# Patient Record
Sex: MALE | Race: WHITE | URBAN - METROPOLITAN AREA
[De-identification: names, ages, dates, MRNs, and addresses within clinical notes are randomized per-mention and may not be internally consistent; named-entity substitution may affect disease eponyms.]

---

## 2024-07-01 ENCOUNTER — TELEPHONE (OUTPATIENT)
Facility: CLINIC | Age: 24
End: 2024-07-01

## 2024-07-01 NOTE — TELEPHONE ENCOUNTER
7/1/24-Received via fax from [facility] medical records dated 10/2020-6/2023. Placed in folder at .

## 2024-07-24 ENCOUNTER — OFFICE VISIT (OUTPATIENT)
Facility: CLINIC | Age: 24
End: 2024-07-24
Payer: COMMERCIAL

## 2024-07-24 ENCOUNTER — TELEPHONE (OUTPATIENT)
Facility: CLINIC | Age: 24
End: 2024-07-24

## 2024-07-24 VITALS
DIASTOLIC BLOOD PRESSURE: 68 MMHG | HEIGHT: 70.5 IN | WEIGHT: 165 LBS | SYSTOLIC BLOOD PRESSURE: 118 MMHG | HEART RATE: 79 BPM | OXYGEN SATURATION: 96 % | BODY MASS INDEX: 23.36 KG/M2

## 2024-07-24 DIAGNOSIS — Z46.81 INSULIN PUMP TITRATION: ICD-10-CM

## 2024-07-24 DIAGNOSIS — E10.65 TYPE 1 DIABETES MELLITUS WITH HYPERGLYCEMIA (HCC): Primary | ICD-10-CM

## 2024-07-24 DIAGNOSIS — Z96.41 INSULIN PUMP IN PLACE: ICD-10-CM

## 2024-07-24 LAB
CARTRIDGE LOT#: ABNORMAL NUMERIC
HEMOGLOBIN A1C: 8.2 % (ref 4.3–5.6)

## 2024-07-24 PROCEDURE — 99205 OFFICE O/P NEW HI 60 MIN: CPT

## 2024-07-24 PROCEDURE — 3052F HG A1C>EQUAL 8.0%<EQUAL 9.0%: CPT

## 2024-07-24 PROCEDURE — 83036 HEMOGLOBIN GLYCOSYLATED A1C: CPT

## 2024-07-24 PROCEDURE — 3008F BODY MASS INDEX DOCD: CPT

## 2024-07-24 PROCEDURE — 3074F SYST BP LT 130 MM HG: CPT

## 2024-07-24 PROCEDURE — 3078F DIAST BP <80 MM HG: CPT

## 2024-07-24 PROCEDURE — 95251 CONT GLUC MNTR ANALYSIS I&R: CPT

## 2024-07-24 RX ORDER — INSULIN LISPRO 100 [IU]/ML
INJECTION, SOLUTION INTRAVENOUS; SUBCUTANEOUS
COMMUNITY
Start: 2024-05-20 | End: 2024-07-24

## 2024-07-24 RX ORDER — GLUCAGON 3 MG/1
3 POWDER NASAL ONCE AS NEEDED
Qty: 1 EACH | Refills: 0 | Status: SHIPPED | OUTPATIENT
Start: 2024-07-24 | End: 2024-07-24

## 2024-07-24 RX ORDER — INSULIN LISPRO 100 [IU]/ML
INJECTION, SOLUTION INTRAVENOUS; SUBCUTANEOUS
Qty: 130 ML | Refills: 2 | Status: SHIPPED | OUTPATIENT
Start: 2024-07-24

## 2024-07-24 RX ORDER — INSULIN DEGLUDEC 100 U/ML
INJECTION, SOLUTION SUBCUTANEOUS
Qty: 15 ML | Refills: 1 | Status: SHIPPED | OUTPATIENT
Start: 2024-07-24

## 2024-07-24 RX ORDER — ACYCLOVIR 400 MG/1
1 TABLET ORAL
Qty: 9 EACH | Refills: 3 | Status: SHIPPED | OUTPATIENT
Start: 2024-07-24

## 2024-07-24 RX ORDER — BLOOD-GLUCOSE SENSOR
EACH MISCELLANEOUS
COMMUNITY
Start: 2024-05-20

## 2024-07-24 RX ORDER — PEN NEEDLE, DIABETIC 32GX 5/32"
NEEDLE, DISPOSABLE MISCELLANEOUS
Qty: 100 EACH | Refills: 1 | Status: SHIPPED | OUTPATIENT
Start: 2024-07-24

## 2024-07-24 RX ORDER — SUBCUTANEOUS INSULIN PUMP
EACH MISCELLANEOUS
COMMUNITY
Start: 2024-07-24

## 2024-07-24 NOTE — TELEPHONE ENCOUNTER
Ordered Dexcom G7 to pharamcy  Started insurance inquiry with tandem for T-slim pump via parachute

## 2024-07-24 NOTE — PATIENT INSTRUCTIONS
Return in about 2 months (around 9/24/2024).    Office phone number: [phone]    - complete fasting labs    Medtronic pump + Ayush 3 CGM   Time Basal ICR ISF   MN 1.40 u/hr --> 1.50 u/hr 8  40   3A 1.40 u/hr--> 1.50 u/hr     7A  8 40   9A 1.50 u/hr        40   2P 1.50 u/hr  35      35   630P  8    10P 1.60 u/hr

## 2024-07-24 NOTE — PROGRESS NOTES
[facility]    Name: Reuben Thompson    Date: 7/24/2024    Reuben Thompson is a 23 year old male who presents for evaluation of T1DM management.     Chief complaint: New Patient (NP here to establish care for DM 1, per pt no current concerns or sx.////)       Subjective:   Initial HPI consult in July 2024  Here to establish care for uncontrolled type 1 diabetes.  Is transferring care from [facility], in [place].  Previously followed with Dr. Kailey Olivas.  Reviewed chart notes has faxed over.  M hx:  -Diagnosed with diabetes at age 18 months.   - Started insulin pump age 7  - Did have 1 severe hypoglycemia episode as a child  - Denies history of DKA, was hospitalized at a young age for high BG   - History of celiac disease diagnosed at a young age-- he eats gluten now and hasn't had a problem  - Family history- none known    - Notes the hardest part for him is to remember to enter [employer]  - No known fam hx of autoimmunity   -Does NOT have any associated DM complications, including: Macrovascular complications (CAD/CVA/PAD), neuropathy, retinopathy, nephropathy, HTN, HLD, stroke, gastroparesis. No hx of pancreatitis.   -Lifestyle: lives stepmom and dad, brother and sister. Works as a  for [employer]-- day shift. He enjoys cars. Also drives motorcycles. He does consume alcohol on the weekends. He states he 'eats like he doesn't have diabetes'. Now that he's moved home with stepmom and dad there is more food and financial stability      DM meds at first office visit: Medtronic (from 2010-2012ish) + Ayush 3 CGM   Time Basal ICR ISF Active Insulin Time Target   MN 1.40 u/hr 8 40 2h    3A 1.40 u/hr         7A  8 40     9A 1.50 u/hr            40     2P 1.50 u/hr  35          35     630P  8      10P 1.60 u/hr           Est basal dose 35.30 u/day  7/19 86u/day  7/21- 79u/day  7/22 73u/day  7/23 103u/day    Continuous Glucose Monitoring Interpretation  Reuben Thompson has undergone  continuous glucose monitoring with the Freestyle Ayush 3 CGM with phone (connected to clinic profile).  The blood glucose tracings were evaluated for two weeks prior to office visit. Blood glucose tracings demonstrated areas of hyperglycemia overnight, also post prandial excursions . There were several areas of hypoglycemia notedduring the weeks of evaluation.        Previously trialed/failed DM meds: novolog- prefers humalog    History/Other:    Allergies, PMH, SocHx and FHx reviewed and updated as appropriate in Epic on    Continuous Glucose Sensor (FREESTYLE AYUSH 3 SENSOR) Does not apply Misc USE 1 EVERY 2 WEEKS      HUMALOG 100 UNIT/ML Injection Solution Use to fill insulin pump with up to 130u/day 130 mL 2    Urine Glucose-Ketones Test In Vitro Strip Use as needed, with ANY episode of VOMITING, during illness, and/or if 2+ BG >250 despite usual correction. 100 strip 0    glucagon (BAQSIMI TWO PACK) 3 MG/DOSE Nasal nasal powder 3 mg by Nasal route once as needed for Low blood glucose. 1 each 0    Insulin Pen Needle (BD PEN NEEDLE OVIDIO U/F) 32G X 4 MM Does not apply Misc Use in the event of pump failure to inject lantus daily. 100 each 1    insulin degludec (TRESIBA FLEXTOUCH) 100 UNIT/ML Subcutaneous Solution Pen-injector Use in the event of pump failure. Inject 30u daily. 15 mL 1    Insulin Syringe-Needle U-100 31G X 15/64\" 0.5 ML Does not apply Misc 1 Syringe 3 (three) times daily before meals as needed. Use in the event of pump failure to inject humalog from vials 100 each 0    Insulin Infusion Pump (T:SLIM X2 CONTROL-IQ PUMP) Does not apply Device Ordered through tandem 7/24/2024       No Known Allergies  Current Outpatient Medications   Medication Sig Dispense Refill    Continuous Glucose Sensor (FREESTYLE AYUSH 3 SENSOR) Does not apply Misc USE 1 EVERY 2 WEEKS      HUMALOG 100 UNIT/ML Injection Solution Use to fill insulin pump with up to 130u/day 130 mL 2    Urine Glucose-Ketones Test In Vitro Strip Use  as needed, with ANY episode of VOMITING, during illness, and/or if 2+ BG >250 despite usual correction. 100 strip 0    glucagon (BAQSIMI TWO PACK) 3 MG/DOSE Nasal nasal powder 3 mg by Nasal route once as needed for Low blood glucose. 1 each 0    Insulin Pen Needle (BD PEN NEEDLE OVIDIO U/F) 32G X 4 MM Does not apply Misc Use in the event of pump failure to inject lantus daily. 100 each 1    insulin degludec (TRESIBA FLEXTOUCH) 100 UNIT/ML Subcutaneous Solution Pen-injector Use in the event of pump failure. Inject 30u daily. 15 mL 1    Insulin Syringe-Needle U-100 31G X 15/64\" 0.5 ML Does not apply Misc 1 Syringe 3 (three) times daily before meals as needed. Use in the event of pump failure to inject humalog from vials 100 each 0    Insulin Infusion Pump (T:SLIM X2 CONTROL-IQ PUMP) Does not apply Device Ordered through tandem 7/24/2024      Continuous Glucose Sensor (DEXCOM G7 SENSOR) Does not apply Misc 1 each Every 10 days. Use as directed every 10 days 9 each 3     Past Medical History:    Type 1 diabetes (HCC)     History reviewed. No pertinent family history.  Social history: Reviewed.      ROS/Exam    REVIEW OF SYSTEMS: Ten point review of systems has been performed and is otherwise negative and/or non-contributory, except as described above.     VITALS  Vitals:    07/24/24 1410   BP: 118/68   BP Location: Left arm   Patient Position: Sitting   Cuff Size: adult   Pulse: 79   SpO2: 96%   Weight: 165 lb (74.8 kg)   Height: 5' 10.5\" (1.791 m)       Wt Readings from Last 6 Encounters:   07/24/24 165 lb (74.8 kg)       PHYSICAL EXAM  CONSTITUTIONAL:  awake, alert, cooperative, no apparent distress, and appears stated age   PSYCH: normal affect  LUNGS: breathing comfortably  CARDIOVASCULAR:  regular rate     Labs/Imaging: Pertinent imaging reviewed.    Overall glucose control:  Lab Results   Component Value Date    A1C 8.2 (A) 07/24/2024       Supplementary Documentation:   -Surveillance for Diabetes Complications &  Risks  Foot exam/neuropathy: No data recorded  Retinopathy screening: No data recordedNo data recorded    Assessment & Plan:     ICD-10-CM    1. Type 1 diabetes mellitus with hyperglycemia (HCC)  E10.65 CMP Now     Lipids Now     Hemoglobin A1C Now     Microalb/Creat Ratio, Random Urine Now     TSH and Free T4 [E]     Continuous Glucose Sensor (FREESTYLE JOSIE 3 SENSOR) Does not apply Misc     HUMALOG 100 UNIT/ML Injection Solution     Urine Glucose-Ketones Test In Vitro Strip     glucagon (BAQSIMI TWO PACK) 3 MG/DOSE Nasal nasal powder     GLUC MNTR CONT REC FROM NTRSTL TISS FLU PHYS I&R     Insulin Pen Needle (BD PEN NEEDLE OVIDIO U/F) 32G X 4 MM Does not apply Misc     insulin degludec (TRESIBA FLEXTOUCH) 100 UNIT/ML Subcutaneous Solution Pen-injector     Insulin Syringe-Needle U-100 31G X 15/64\" 0.5 ML Does not apply Misc     Insulin Infusion Pump (T:SLIM X2 CONTROL-IQ PUMP) Does not apply Device     HEMOGLOBIN A1C      2. Insulin pump in place  Z96.41       3. Insulin pump titration  Z46.81           Reuben Thompson is a pleasant 23 year old male here for evaluation of:    #Diabetes- PMHx of Type 1 diabetes mellitus diagnosed in 2001. Switched to insulin pump in 2009 (age 7). Historically uncontrolled DM. Improving (states today is his best A1C in a long time). Has more stability in current living situation.  Overnight hyperglycemia-- will incr basals (using TDD 80-100u/day, likely under basalized). Will make more setting adjustments once we switch to closed loop pump. He is interested in starting Tandem insulin pump.  Will start order + transition to dexcom G7-- pending insurance. We reviewed pump back up plan, ketone testing and glucagon use briefly today.     Last A1c value was 8.2% done 7/24/2024. Goal <7%. Importance of better glucose control in preventing onset/progression of end-organ damage discussed, as well as goals of therapy and clinical significance of A1C.     - med changes:  - Medtronic pump +  Ayush 3 CGM   Time Basal ICR ISF   MN 1.40 u/hr --> 1.60 u/hr 8  40   3A 1.40 u/hr--> 1.60 u/hr     7A  8- 40   9A 1.50 u/hr        40   2P 1.50 u/hr  35      35   630P  8    10P 1.60 u/hr       - start Dexcom G7 CGM with phone (connected to clinic profile)  - patient is on insulin, and has suboptimal glycemic control including wide glycemic swings, thus would benefit from CGM  - pump backup plan: tresiba 30u daily, humalog ICR 1:8, ISF 1:40>140    -See above header \"Supplementary Documentation\" for surveillance for diabetes complications & risks    #Nephropathy screening/CKD:   No results found for: \"EGFRCR\", \"MICROALBCREA\"    -Due for repeat, ordered today    #Blood pressure control: SBP is not to goal <130    -Due for repeat, ordered today  BP Readings from Last 1 Encounters:   07/24/24 118/68   BP Meds:  none    #Hyperlipidemia/Lipids: LDL is not to goal No results found for: \"LDL\", \"TRIG\"  Cholesterol Meds:  none   -Due for repeat, ordered today      Return in about 2 months (around 9/24/2024).    A total of 60 minutes was spent today on obtaining history, reviewing pertinent labs, evaluating patient, providing multiple treatment options, reinforcing diet/exercise and compliance, and completing documentation.       7/24/2024  MITALI Adam    Note to patient: The 21 Century Cures Act makes medical notes like these available to patients in the interest of transparency. However, be advised this is a medical document. It is intended as peer to peer communication. It is written in medical language and may contain abbreviations or verbiage that are unfamiliar. It may appear blunt or direct. Medical documents are intended to carry relevant information, facts as evident, and the clinical opinion of the practitioner.

## 2024-09-26 ENCOUNTER — OFFICE VISIT (OUTPATIENT)
Facility: CLINIC | Age: 24
End: 2024-09-26
Payer: COMMERCIAL

## 2024-09-26 VITALS
OXYGEN SATURATION: 96 % | SYSTOLIC BLOOD PRESSURE: 102 MMHG | WEIGHT: 170.63 LBS | DIASTOLIC BLOOD PRESSURE: 62 MMHG | BODY MASS INDEX: 24 KG/M2 | HEART RATE: 71 BPM

## 2024-09-26 DIAGNOSIS — Z46.81 INSULIN PUMP TITRATION: ICD-10-CM

## 2024-09-26 DIAGNOSIS — E10.65 TYPE 1 DIABETES MELLITUS WITH HYPERGLYCEMIA (HCC): Primary | ICD-10-CM

## 2024-09-26 PROCEDURE — 3078F DIAST BP <80 MM HG: CPT

## 2024-09-26 PROCEDURE — 3074F SYST BP LT 130 MM HG: CPT

## 2024-09-26 PROCEDURE — 99215 OFFICE O/P EST HI 40 MIN: CPT

## 2024-09-26 NOTE — PROGRESS NOTES
[facility]    Name: Reuben Thompson    Date: 9/26/2024    Reuben Thompson is a 23 year old male who presents for evaluation of T1DM management.     Chief complaint: Follow - Up (Patient states has been having highs. )     Subjective:   Initial HPI consult in July 2024  Here to establish care for uncontrolled type 1 diabetes.  Is transferring care from [facility], in [place].  Previously followed with Dr. Kailey Olivas.  Reviewed chart notes has faxed over.  M hx:  -Diagnosed with diabetes at age 18 months.   - Started insulin pump age 7  - Did have 1 severe hypoglycemia episode as a child  - Denies history of DKA, was hospitalized at a young age for high BG   - History of celiac disease diagnosed at a young age-- he eats gluten now and hasn't had a problem  - Family history- none known    - Notes the hardest part for him is to remember to enter [employer]  - No known fam hx of autoimmunity   -Does NOT have any associated DM complications, including: Macrovascular complications (CAD/CVA/PAD), neuropathy, retinopathy, nephropathy, HTN, HLD, stroke, gastroparesis. No hx of pancreatitis.   -Lifestyle: lives stepmom and dad, brother and sister. Works as a  for [employer]-- day shift. He enjoys cars. Also drives motorcycles. He does consume alcohol on the weekends. He states he 'eats like he doesn't have diabetes'. Now that he's moved home with stepmom and dad there is more food and financial stability    Interim hx:  9/26/2024-libertad/ Caitlyn JASMINE.Last A1c value was 8.2% done 7/24/2024.  Upgraded to Dexcom G7 and inquried re: tandem pricing. He might move forward with a payment plan for tandem pump, but is going to wait until he uses up some of his medtronic supplies. Notes higher BG overnight while sleeping and sometimes during the day, forgets to take a meal dose. Unable to review dexcom- not yet connected to clinic and there's a problem with his clarity unruly today.    -300  If forgets to  dose at work 200-300    DM meds at visit: Medtronic (from 2010-2012ish) + Ayush 3 CGM   Time Basal ICR ISF   MN 1.50 u/hr 8  40   3A 1.50 u/hr     7A  8 40   9A 1.50 u/hr     11A   40   2P 1.50 u/hr  35   5P   35   630P  8    10P 1.60 u/hr     History from pump re: doses: 80-90u/day      Previously trialed/failed DM meds: novolog- prefers humalog    History/Other:    Allergies, PMH, SocHx and FHx reviewed and updated as appropriate in Epic on   No outpatient medications have been marked as taking for the 9/26/24 encounter (Office Visit) with Holly Das APN.     No Known Allergies  Current Outpatient Medications   Medication Sig Dispense Refill    Continuous Glucose Sensor (FREESTYLE AYUSH 3 SENSOR) Does not apply Misc USE 1 EVERY 2 WEEKS      HUMALOG 100 UNIT/ML Injection Solution Use to fill insulin pump with up to 130u/day 130 mL 2    Continuous Glucose Sensor (DEXCOM G7 SENSOR) Does not apply Misc 1 each Every 10 days. Use as directed every 10 days 9 each 3    Urine Glucose-Ketones Test In Vitro Strip Use as needed, with ANY episode of VOMITING, during illness, and/or if 2+ BG >250 despite usual correction. 100 strip 0    Insulin Pen Needle (BD PEN NEEDLE OVIDIO U/F) 32G X 4 MM Does not apply Misc Use in the event of pump failure to inject lantus daily. 100 each 1    insulin degludec (TRESIBA FLEXTOUCH) 100 UNIT/ML Subcutaneous Solution Pen-injector Use in the event of pump failure. Inject 30u daily. 15 mL 1    Insulin Syringe-Needle U-100 31G X 15/64\" 0.5 ML Does not apply Misc 1 Syringe 3 (three) times daily before meals as needed. Use in the event of pump failure to inject humalog from vials 100 each 0    Insulin Infusion Pump (T:SLIM X2 CONTROL-IQ PUMP) Does not apply Device Ordered through tandem 7/24/2024       Past Medical History:    Type 1 diabetes (HCC)     History reviewed. No pertinent family history.  Social history: Reviewed.      ROS/Exam    REVIEW OF SYSTEMS: Ten point review of systems has  been performed and is otherwise negative and/or non-contributory, except as described above.     VITALS  Vitals:    09/26/24 1047   BP: 102/62   BP Location: Left arm   Patient Position: Sitting   Cuff Size: adult   Pulse: 71   SpO2: 96%   Weight: 170 lb 9.6 oz (77.4 kg)       Wt Readings from Last 6 Encounters:   09/26/24 170 lb 9.6 oz (77.4 kg)   07/24/24 165 lb (74.8 kg)       PHYSICAL EXAM  CONSTITUTIONAL:  awake, alert, cooperative, no apparent distress, and appears stated age   PSYCH: normal affect  LUNGS: breathing comfortably  CARDIOVASCULAR:  regular rate     Labs/Imaging: Pertinent imaging reviewed.    Overall glucose control:  Lab Results   Component Value Date    A1C 8.2 (A) 07/24/2024       Supplementary Documentation:   -Surveillance for Diabetes Complications & Risks  Foot exam/neuropathy: No data recorded  Retinopathy screening: No data recordedNo data recorded    Assessment & Plan:     ICD-10-CM    1. Type 1 diabetes mellitus with hyperglycemia (HCC)  E10.65       2. Insulin pump titration  Z46.81           Reuben Thompson is a pleasant 23 year old male here for evaluation of:    #Type 1 Diabetes- PMHx of Type 1 diabetes mellitus diagnosed in 2001. Switched to insulin pump in 2009 (age 7). Historically uncontrolled DM. Improving (states today is his best A1C in a long time). Has more stability in current living situation.  Overnight hyperglycemia-- will incr basals (using TDD 80-100u/day, likely under basalized). Will make more setting adjustments once we switch to closed loop pump. He is interested in starting Tandem insulin pump- if he pursues payment plan, to let our office know and we can coordinate training. Limited in adjustments today without much data.    Last A1c value was 8.2% done 7/24/2024. Goal <7%. Importance of better glucose control in preventing onset/progression of end-organ damage discussed, as well as goals of therapy and clinical significance of A1C.     - med changes:  -  Medtronic pump (from 2011/2012) + Dexcom G7  Pump settings adjusted (created same profile throughout the day-- once I have more data can adjust)   Time Basal ICR ISF   MN 1.50 u/hr --> 1.70 u/hr 8  40 --> 30   3A 1.50 u/hr --> 1.70 u/hr     7A  8 40-> 30   9A 1.50 u/hr --> 1.70 u/hr     11A   40-> 30   2P 1.50 u/hr--> 1.70 u/hr  35-> 30   5P   35-> 30   630P  8    10P 1.60 u/hr --> 1.70u/hr         - continue Dexcom G7 CGM with phone (connected to clinic profile)  - patient is on insulin, and has suboptimal glycemic control including wide glycemic swings, thus would benefit from CGM  - pump backup plan: tresiba 30u daily, humalog ICR 1:8, ISF 1:40>140   -See above header \"Supplementary Documentation\" for surveillance for diabetes complications & risks    Nephropathy screening  No results found for: \"EGFRCR\", \"MICROALBCREA\"    -Due for repeat, reminded    Blood pressure control: SBP is not to goal <130   - stable  BP Readings from Last 1 Encounters:   09/26/24 102/62   BP Meds:  none    Lipids: LDL is not to goal No results found for: \"LDL\", \"TRIG\"  Cholesterol Meds:  none   -Due for repeat, reminded      Return in about 2 months (around 11/26/2024) for DM follow up.    A total of 40 minutes was spent today on obtaining history, reviewing pertinent labs, evaluating patient, providing multiple treatment options, reinforcing diet/exercise and compliance, and completing documentation.         9/26/2024  MITALI Adam    Note to patient: The 21 Century Cures Act makes medical notes like these available to patients in the interest of transparency. However, be advised this is a medical document. It is intended as peer to peer communication. It is written in medical language and may contain abbreviations or verbiage that are unfamiliar. It may appear blunt or direct. Medical documents are intended to carry relevant information, facts as evident, and the clinical opinion of the practitioner.

## 2024-09-26 NOTE — PATIENT INSTRUCTIONS
Follow up plan:  With MITALI Adam: Return in about 2 months (around 11/26/2024) for DM follow up.    -complete fasting labs at any [facility] lab location     How to connect to the clinic via [employer]  If you do not already have the Clarity unruly, please download the unruly from the unruly store   Use the same login you have for your Dexcom unruly to login to clarity  Once you are logged in, select the \"profile\" button on the lower right corner of your screen.  Then select \"Manage Data Sharing\" and select the blue continue button  Please enter the following clinic code and then select \"continue\"        THE CLINIC CODE IS: [id number]  6. Once you hit continue you should see our clinic name pop up on the screen     Pump settings adjusted:   Time Basal ICR ISF   MN 1.50 u/hr --> 1.70 u/hr 8  40 --> 30   3A 1.50 u/hr --> 1.70 u/hr     7A  8 40-> 30   9A 1.50 u/hr --> 1.70 u/hr     11A   40-> 30   2P 1.50 u/hr--> 1.70 u/hr  35-> 30   5P   35-> 30   630P  8    10P 1.60 u/hr --> 1.70u/hr         Updated/current diabetes medication instructions:  Diabetic Medications               HUMALOG 100 UNIT/ML Injection Solution Use to fill insulin pump with up to 130u/day    insulin degludec (TRESIBA FLEXTOUCH) 100 UNIT/ML Subcutaneous Solution Pen-injector Use in the event of pump failure. Inject 30u daily.            Office phone number: [phone]; phones are open Monday-Friday 8:30-4:30.   Thank you for visiting our office. We look forward to working with you to reach your health goals. As a reminder, if you need refills, please request early so there is enough time to process the request. We ask that you provide at least 5 days' notice before a refill is due, so there is time to send a request to pharmacy, process the refill, and ensure there are no other problems with obtaining the medication (backorders, prior authorization paperwork, etc). Routine refills will not be addressed on weekends, so please submit these requests  during the week.    Blood sugar targets:  Before breakfast: , 2 hours after meals: <180 (preferably <150), A1C goal: <7.0%  Time in Range goal is higher than 80% if using a continuous glucose monitor (Dexcom or Ayush).  Time in Range can be found within the Dexcom G7 unruly, on Clarity if using Dexcom G6, or on LibreView if using Ayush.    HOW TO TREAT LOW BLOOD SUGAR (Hypoglycemia)  Low blood sugar= Less than 70, or if you start to have symptoms (below)  Symptoms: Shaking or trembling, fast heart rate, extreme hunger, sweating, confusion/difficulty concentrating, dizziness.    How to treat a low blood sugar if you are able to eat/drink: The Rule of 15  If you are using continuous glucose monitor that says you are low, but you do not have any symptoms, verify on fingerstick that your blood sugar is actually low before treating.   Eat 15 grams of carbs (see examples below)  Check your blood sugar after 15 minutes. If it’s still below your target range, have another serving.   Repeat these steps until it’s in your target range. Once it’s in range, if you're nervous about your sugar going low again, have a protein source (ie, a spoonful of peanut butter).   If you have a CGM you want to look for how your arrow has changed. If you arrow is pointed up or sideways after 15 min, give your CGM more time OR check with a finger stick. Try not to eat more food until at least 15 min after the first BG check - otherwise you risk having a rebound high.  If you are experiencing symptoms and you are unable to check your blood glucose for any reason, treat the hypoglycemia.  If someone has a low blood sugar and is unconscious: Don’t hesitate to call 911. If someone is unconscious and glucagon is not available or someone does not know how to use it, call 911 immediately.    To treat a low, I recommend you carry with you easy, pre-portioned treatment for low blood sugars that are 15G of carbs:   - Children sized squeeze pouch  applesauce (high fiber + carbs help prevent too high of a spike)  - Small children's sized juicebox- 15g carb --> 4oz juice box  - Glucose tablets from [employer], you can find them near diabetes supplies --> Note, you will need to eat 3-4 tablets to get to 15g of carbs  - Children sized fruit snack pack- look for one with 15 grams of total carbohydrate  - Choice of how to treat your low is important. Complex carbs, or foods that contain fats along with carbs (like chocolate) can slow the absorption of glucose and should NOT be used to treat an emergency low

## 2024-12-03 ENCOUNTER — TELEPHONE (OUTPATIENT)
Facility: CLINIC | Age: 24
End: 2024-12-03

## 2024-12-03 NOTE — TELEPHONE ENCOUNTER
Received letter from [facility]. States \"claims as of 10/31/24 suggest this patient with diabetes may not have had a urine protein test in the last year.\"    Placed in provider in basket for review.

## 2024-12-05 ENCOUNTER — LAB ENCOUNTER (OUTPATIENT)
Dept: LAB | Age: 24
End: 2024-12-05
Payer: COMMERCIAL

## 2024-12-05 ENCOUNTER — OFFICE VISIT (OUTPATIENT)
Facility: CLINIC | Age: 24
End: 2024-12-05
Payer: COMMERCIAL

## 2024-12-05 VITALS
SYSTOLIC BLOOD PRESSURE: 118 MMHG | HEIGHT: 70 IN | WEIGHT: 179.19 LBS | BODY MASS INDEX: 25.65 KG/M2 | OXYGEN SATURATION: 93 % | HEART RATE: 82 BPM | DIASTOLIC BLOOD PRESSURE: 62 MMHG

## 2024-12-05 DIAGNOSIS — E10.65 TYPE 1 DIABETES MELLITUS WITH HYPERGLYCEMIA (HCC): ICD-10-CM

## 2024-12-05 DIAGNOSIS — Z46.81 INSULIN PUMP TITRATION: ICD-10-CM

## 2024-12-05 DIAGNOSIS — E10.65 TYPE 1 DIABETES MELLITUS WITH HYPERGLYCEMIA (HCC): Primary | ICD-10-CM

## 2024-12-05 LAB
ALBUMIN SERPL-MCNC: 4.4 G/DL (ref 3.2–4.8)
ALBUMIN/GLOB SERPL: 1.6 {RATIO} (ref 1–2)
ALP LIVER SERPL-CCNC: 108 U/L
ALT SERPL-CCNC: 23 U/L
ANION GAP SERPL CALC-SCNC: 5 MMOL/L (ref 0–18)
AST SERPL-CCNC: 18 U/L (ref ?–34)
BILIRUB SERPL-MCNC: 0.7 MG/DL (ref 0.3–1.2)
BUN BLD-MCNC: 8 MG/DL (ref 9–23)
CALCIUM BLD-MCNC: 9.6 MG/DL (ref 8.7–10.4)
CHLORIDE SERPL-SCNC: 104 MMOL/L (ref 98–112)
CHOLEST SERPL-MCNC: 142 MG/DL (ref ?–200)
CO2 SERPL-SCNC: 32 MMOL/L (ref 21–32)
CREAT BLD-MCNC: 0.94 MG/DL
EGFRCR SERPLBLD CKD-EPI 2021: 115 ML/MIN/1.73M2 (ref 60–?)
EST. AVERAGE GLUCOSE BLD GHB EST-MCNC: 194 MG/DL (ref 68–126)
FASTING PATIENT LIPID ANSWER: YES
FASTING STATUS PATIENT QL REPORTED: YES
GLOBULIN PLAS-MCNC: 2.7 G/DL (ref 2–3.5)
GLUCOSE BLD-MCNC: 140 MG/DL (ref 70–99)
HBA1C MFR BLD: 8.4 % (ref ?–5.7)
HDLC SERPL-MCNC: 39 MG/DL (ref 40–59)
HEMOGLOBIN A1C: 8.2 % (ref 4.3–5.6)
LDLC SERPL CALC-MCNC: 90 MG/DL (ref ?–100)
NONHDLC SERPL-MCNC: 103 MG/DL (ref ?–130)
OSMOLALITY SERPL CALC.SUM OF ELEC: 293 MOSM/KG (ref 275–295)
POTASSIUM SERPL-SCNC: 4 MMOL/L (ref 3.5–5.1)
PROT SERPL-MCNC: 7.1 G/DL (ref 5.7–8.2)
SODIUM SERPL-SCNC: 141 MMOL/L (ref 136–145)
T4 FREE SERPL-MCNC: 1.2 NG/DL (ref 0.8–1.7)
TRIGL SERPL-MCNC: 65 MG/DL (ref 30–149)
TSI SER-ACNC: 1.1 UIU/ML (ref 0.55–4.78)
VLDLC SERPL CALC-MCNC: 10 MG/DL (ref 0–30)

## 2024-12-05 PROCEDURE — 3052F HG A1C>EQUAL 8.0%<EQUAL 9.0%: CPT

## 2024-12-05 PROCEDURE — 80061 LIPID PANEL: CPT

## 2024-12-05 PROCEDURE — 84439 ASSAY OF FREE THYROXINE: CPT

## 2024-12-05 PROCEDURE — 99214 OFFICE O/P EST MOD 30 MIN: CPT

## 2024-12-05 PROCEDURE — 3078F DIAST BP <80 MM HG: CPT

## 2024-12-05 PROCEDURE — 84443 ASSAY THYROID STIM HORMONE: CPT

## 2024-12-05 PROCEDURE — 3074F SYST BP LT 130 MM HG: CPT

## 2024-12-05 PROCEDURE — 3008F BODY MASS INDEX DOCD: CPT

## 2024-12-05 PROCEDURE — 95251 CONT GLUC MNTR ANALYSIS I&R: CPT

## 2024-12-05 PROCEDURE — 83036 HEMOGLOBIN GLYCOSYLATED A1C: CPT

## 2024-12-05 PROCEDURE — 80053 COMPREHEN METABOLIC PANEL: CPT

## 2024-12-05 NOTE — PATIENT INSTRUCTIONS
Follow up plan:  With MITALI Adam: Return in about 2 months (around 2/5/2025) for diabetes follow up.   - Schedule Tandem pump training with Elsa next Thursday at 10:15   - get labs done downstairs     - complete your diabetes eye exam. This exam is critical to preventing and treating eye conditions caused by diabetes that could potentially lead to vision loss. Once complete, please call your eye care provider and ask them to fax your latest report to our office. This will help us update our records. Our fax number is: [phone]     Eye doctor list  [place] area:  -Dr. Fitzgerald with [facility]  -[facility]: Dr Stephanie Godfrey-[phone]  -[facility]: [phone]  [facility]: Phone: [phone], Address: [address] [place] [address]  [place] ophthalmologist: Dr. Connors [phone] locations in [place], [place], [facility]:  Dr. Oppenheim in [place]- [phone]  [facility]- Dr. Wilkerson- [phone]  [facility]:  [phone]  [place]:   [facility] [address] [place] [address]- [phone]  [facility] - Dr. Bill [phone]    Medtronic pump (from 2011/2012, unable to download) + Dexcom G7   Time Basal ICR ISF   MN  1.70 u/hr --> 1.50 u/hr 8  30   3A 1.70 u/hr     7A  8 30   9A 1.70 u/hr     11A   30   2P  1.70 u/hr  30   5P   30   630P --> 4pm  8-->7    10P 1.70u/hr       - pump backup plan: tresiba 30u daily, humalog ICR 1:8, ISF 1:40>140       Lab Results   Component Value Date    A1C 8.2 (A) 12/05/2024    A1C 8.2 (A) 07/24/2024        Updated/current diabetes medication instructions:  Diabetic Medications               HUMALOG 100 UNIT/ML Injection Solution (Taking) Use to fill insulin pump with up to 130u/day    insulin degludec (TRESIBA FLEXTOUCH) 100 UNIT/ML Subcutaneous Solution Pen-injector (Taking) Use in the  event of pump failure. Inject 30u daily.            Office phone number: [phone]; phones are open Monday-Friday 8:30-4:30.   Thank you for visiting our office. We look forward to working with you to reach your health goals. As a reminder, if you need refills, please request early so there is enough time to process the request. We ask that you provide at least 5 days' notice before a refill is due, so there is time to send a request to pharmacy, process the refill, and ensure there are no other problems with obtaining the medication (backorders, prior authorization paperwork, etc). Routine refills will not be addressed on weekends, so please submit these requests during the week.    Blood sugar targets:  Before breakfast: , 2 hours after meals: <180 (preferably <150), A1C goal: <7.0%  Time in Range goal is higher than 80% if using a continuous glucose monitor (Dexcom or Ayush).  Time in Range can be found within the Dexcom G7 unruly, on Clarity if using Dexcom G6, or on LibreView if using Ayush.    HOW TO TREAT LOW BLOOD SUGAR (Hypoglycemia)  Low blood sugar= Less than 70, or if you start to have symptoms (below)  Symptoms: Shaking or trembling, fast heart rate, extreme hunger, sweating, confusion/difficulty concentrating, dizziness.    How to treat a low blood sugar if you are able to eat/drink: The Rule of 15  If you are using continuous glucose monitor that says you are low, but you do not have any symptoms, verify on fingerstick that your blood sugar is actually low before treating.   Eat 15 grams of carbs (see examples below)  Check your blood sugar after 15 minutes. If it’s still below your target range, have another serving.   Repeat these steps until it’s in your target range. Once it’s in range, if you're nervous about your sugar going low again, have a protein source (ie, a spoonful of peanut butter).   If you have a CGM you want to look for how your arrow has changed. If you arrow is pointed up or  sideways after 15 min, give your CGM more time OR check with a finger stick. Try not to eat more food until at least 15 min after the first BG check - otherwise you risk having a rebound high.  If you are experiencing symptoms and you are unable to check your blood glucose for any reason, treat the hypoglycemia.  If someone has a low blood sugar and is unconscious: Don’t hesitate to call 911. If someone is unconscious and glucagon is not available or someone does not know how to use it, call 911 immediately.    To treat a low, I recommend you carry with you easy, pre-portioned treatment for low blood sugars that are 15G of carbs:   - Children sized squeeze pouch applesauce (high fiber + carbs help prevent too high of a spike)  - Small children's sized juicebox- 15g carb --> 4oz juice box  - Glucose tablets from [employer], you can find them near diabetes supplies --> Note, you will need to eat 3-4 tablets to get to 15g of carbs  - Children sized fruit snack pack- look for one with 15 grams of total carbohydrate  - Choice of how to treat your low is important. Complex carbs, or foods that contain fats along with carbs (like chocolate) can slow the absorption of glucose and should NOT be used to treat an emergency low

## 2024-12-05 NOTE — PROGRESS NOTES
[facility] Note    Name: Reuben Thompson    Date: 12/5/2024    Reuben Thompson is a 24 year old male who presents for evaluation of T1DM management.     Chief complaint: Follow - Up (Pt presents for DM follow up. Repeat A1C completed in clinic.  States has been improved since adjustments to insulin but still forgets to enter adjustments prior to eating. Unable to download Medtronic pump d/t being too old. )     Subjective:   Initial HPI consult in July 2024  Here to establish care for uncontrolled type 1 diabetes.  Is transferring care from [facility], in [place].  Previously followed with Dr. Kailey Olivas.  Reviewed chart notes has faxed over.  M hx:  -Diagnosed with diabetes at age 18 months.   - Started insulin pump age 7  - Did have 1 severe hypoglycemia episode as a child  - Denies history of DKA, was hospitalized at a young age for high BG   - History of celiac disease diagnosed at a young age-- he eats gluten now and hasn't had a problem  - Family history- none known    - Notes the hardest part for him is to remember to enter carbs  - No known fam hx of autoimmunity   -Does NOT have any associated DM complications, including: Macrovascular complications (CAD/CVA/PAD), neuropathy, retinopathy, nephropathy, HTN, HLD, stroke, gastroparesis. No hx of pancreatitis.   -Lifestyle: lives stepmom and dad, brother and sister. Works as a  for [employer]-- day shift. He enjoys cars. Also drives motorcycles. He does consume alcohol on the weekends. He states he 'eats like he doesn't have diabetes'. Now that he's moved home with stepmom and dad there is more food and financial stability    Interim hx:  12/5/2024-libertad/ Caitlyn JASMINE.Last A1c value was 8.2% done 12/5/2024. Here with girlfriend, Lizbeth  Still forgetting to enter his carbs. Hasn't upgraded his pump yet-  it should be arriving tomorrow  Had a low BG this AM and didn't wake up to it, girlfriend had to wake him up  He is hoping to change  jobs sooner to [employer], not sure what shift  Last office visit we intensified basal and ISF    DM meds at visit: - Medtronic pump (from 2011/2012, unable to download) + Dexcom G7   Time Basal ICR ISF   MN  1.70 u/hr 8  30   3A 1.70 u/hr     7A  8 30   9A 1.70 u/hr     11A   30   2P  1.70 u/hr  30   5P   30   630P  8    10P 1.70u/hr       - continue Dexcom G7 CGM     Continuous Glucose Monitoring Interpretation    Reuben Thompson has undergone continuous glucose monitoring with the Dexcom G7 CGM. The blood glucose tracings were evaluated for two weeks prior to office visit.  Blood glucose tracings demonstrated areas of hyperglycemia post-meal, particularly post-dinner . There were occasional hypoglycemia, particularly in the early morning or what appears to be after a bolus (unable to see pump history)   during the weeks of evaluation.              History/Other:    Allergies, PMH, SocHx and FHx reviewed and updated as appropriate in Epic on    Continuous Glucose Sensor (FREESTYLE JOSIE 3 SENSOR) Does not apply Misc USE 1 EVERY 2 WEEKS      HUMALOG 100 UNIT/ML Injection Solution Use to fill insulin pump with up to 130u/day 130 mL 2    Continuous Glucose Sensor (DEXCOM G7 SENSOR) Does not apply Misc 1 each Every 10 days. Use as directed every 10 days 9 each 3    Urine Glucose-Ketones Test In Vitro Strip Use as needed, with ANY episode of VOMITING, during illness, and/or if 2+ BG >250 despite usual correction. 100 strip 0    Insulin Pen Needle (BD PEN NEEDLE OVIDIO U/F) 32G X 4 MM Does not apply Misc Use in the event of pump failure to inject lantus daily. 100 each 1    insulin degludec (TRESIBA FLEXTOUCH) 100 UNIT/ML Subcutaneous Solution Pen-injector Use in the event of pump failure. Inject 30u daily. 15 mL 1    Insulin Syringe-Needle U-100 31G X 15/64\" 0.5 ML Does not apply Misc 1 Syringe 3 (three) times daily before meals as needed. Use in the event of pump failure to inject humalog from vials 100  each 0    Insulin Infusion Pump (T:SLIM X2 CONTROL-IQ PUMP) Does not apply Device Ordered through tandem 7/24/2024       No Known Allergies  Current Outpatient Medications   Medication Sig Dispense Refill    Continuous Glucose Sensor (FREESTYLE JOSIE 3 SENSOR) Does not apply Misc USE 1 EVERY 2 WEEKS      HUMALOG 100 UNIT/ML Injection Solution Use to fill insulin pump with up to 130u/day 130 mL 2    Continuous Glucose Sensor (DEXCOM G7 SENSOR) Does not apply Misc 1 each Every 10 days. Use as directed every 10 days 9 each 3    Urine Glucose-Ketones Test In Vitro Strip Use as needed, with ANY episode of VOMITING, during illness, and/or if 2+ BG >250 despite usual correction. 100 strip 0    Insulin Pen Needle (BD PEN NEEDLE OVIDIO U/F) 32G X 4 MM Does not apply Misc Use in the event of pump failure to inject lantus daily. 100 each 1    insulin degludec (TRESIBA FLEXTOUCH) 100 UNIT/ML Subcutaneous Solution Pen-injector Use in the event of pump failure. Inject 30u daily. 15 mL 1    Insulin Syringe-Needle U-100 31G X 15/64\" 0.5 ML Does not apply Misc 1 Syringe 3 (three) times daily before meals as needed. Use in the event of pump failure to inject humalog from vials 100 each 0    Insulin Infusion Pump (T:SLIM X2 CONTROL-IQ PUMP) Does not apply Device Ordered through tandem 7/24/2024       Past Medical History:    Type 1 diabetes (HCC)     No family history on file.  Social history: Reviewed.      ROS/Exam    REVIEW OF SYSTEMS: Ten point review of systems has been performed and is otherwise negative and/or non-contributory, except as described above.     VITALS  Vitals:    12/05/24 0906   BP: 118/62   BP Location: Left arm   Patient Position: Sitting   Cuff Size: adult   Pulse: 82   SpO2: 93%   Weight: 179 lb 3.2 oz (81.3 kg)   Height: 5' 10\" (1.778 m)       Wt Readings from Last 6 Encounters:   12/05/24 179 lb 3.2 oz (81.3 kg)   09/26/24 170 lb 9.6 oz (77.4 kg)   07/24/24 165 lb (74.8 kg)       PHYSICAL EXAM  CONSTITUTIONAL:   awake, alert, cooperative, no apparent distress, and appears stated age   PSYCH: normal affect  LUNGS: breathing comfortably  CARDIOVASCULAR:  regular rate     Labs/Imaging: Pertinent imaging reviewed.    Overall glucose control:  Lab Results   Component Value Date    A1C 8.2 (A) 12/05/2024    A1C 8.2 (A) 07/24/2024       Supplementary Documentation:   -Surveillance for Diabetes Complications & Risks  Foot exam/neuropathy: No data recorded  Retinopathy screening: No data recordedNo data recorded- due, reminded    Assessment & Plan:     ICD-10-CM    1. Type 1 diabetes mellitus with hyperglycemia (HCC)  E10.65 POC Hemoglobin A1C          Reuben Thompson is a pleasant 24 year old male here for evaluation of:    #Type 1 Diabetes- PMHx of Type 1 diabetes mellitus diagnosed in 2001. Switched to insulin pump in 2009 (age 7). Historically uncontrolled DM.  Improving (states today is his best A1C in a long time). Has more stability in current living situation.  Overnight hypoglycemia in the AM, will adjust, and post meal hyperglycemia around dinnertime. He is upgrading to Tandem pump next week (planning settings below) which will provide more stability in A1C.     Last A1c value was 8.2% done 12/5/2024. Goal <7%. Importance of better glucose control in preventing onset/progression of end-organ damage discussed, as well as goals of therapy and clinical significance of A1C.     - med changes:  - Medtronic pump (from 2011/2012, unable to download) + Dexcom G7   Time Basal ICR ISF   MN  1.70 u/hr --> 1.50 u/hr 8  30   3A 1.70 u/hr     7A  8 30   2P  1.70 u/hr  30   630P --> 4pm  8-->7    10P 1.70u/hr       Tandem pump start settings to start:   Tandem Tslim + Dexcom G7   Time Basal ICR ISF   MN 1.50 u/hr 8  30     - pump backup plan: tresiba 30u daily, humalog ICR 1:8, ISF 1:40>140   - continue Dexcom G7 CGM with phone (connected to clinic profile)  - patient is on insulin, and has suboptimal glycemic control including wide  glycemic swings, thus would benefit from CGM  - pump backup plan: tresiba 30u daily, humalog ICR 1:8, ISF 1:40>140   -See above header \"Supplementary Documentation\" for surveillance for diabetes complications & risks    Nephropathy screening  No results found for: \"EGFRCR\", \"MICROALBCREA\"    -Due for repeat, reminded    Blood pressure control: SBP is not to goal <130   - stable  BP Readings from Last 1 Encounters:   12/05/24 118/62   BP Meds:  none    Lipids: LDL is not to goal No results found for: \"LDL\", \"TRIG\"  Cholesterol Meds:  none   -Due for repeat, reminded      No follow-ups on file.      12/5/2024  MITALI Adam    Note to patient: The 21 Century Cures Act makes medical notes like these available to patients in the interest of transparency. However, be advised this is a medical document. It is intended as peer to peer communication. It is written in medical language and may contain abbreviations or verbiage that are unfamiliar. It may appear blunt or direct. Medical documents are intended to carry relevant information, facts as evident, and the clinical opinion of the practitioner.

## 2024-12-12 NOTE — PROGRESS NOTES
Tandem T-slim Start Training    Reuben Escobedoman  11/12/2000    Referred by: MITALI Castaneda     Initial Pump Settings     Tandem Tslim + Dexcom G7   Time Basal ICR ISF   MN 1.50 u/hr 8  30       Training Topics     - Tandem Control IQ algorithm overview and explanation   - Overview of screens on unruly / controller including visuals of CGM data, IOB, pod information and accessing the trend graph; instruction on overall physical use of the device.  - Overview of menus and use of different screens to control the pump beyond set-up  - How to place a site and fill tubing correctly, including placement on the body and proper insulin loading technique  - Overview of additional information including travel instructions, water resistance, appropriate storage of supplies, pod failure instructions with insulin delivery back up plan and review of hypo / hyperglycemia treatment.  - Alerts and alarms; when to contact Tandem vs contact the clinic  - Connected to clinic via Tandem Source   --> Pump SN: [id number]    Patient placed first site left back    Patient shows positive teach back of ability to adequately place infusion set and use pump safely at home.     Future Appointments   Date Time Provider Department Center   12/12/2024 10:15 AM [facility] DIABETIC EDUCATOR ENDO [facility] [place]   2/6/2025  9:15 AM [place] APN [facility] [place]        12/12/2024  Elsa Malone RN, MSN, [facility], [facility]  Diabetes Care &      A total of 50 minutes was spent with the patient including chart review, discussion and education / advisement pertinent to patient and provider specified concerns as documented above.     Note to patient: The 21 Century Cures Act makes medical notes like these available to patients in the interest of transparency. However, be advised this is a medical document. It is intended as peer to peer communication. It is written in medical language and may contain abbreviations or verbiage  that are unfamiliar. It may appear blunt or direct. Medical documents are intended to carry relevant information, facts as evident, and the clinical opinion of the practitioner.

## 2024-12-19 NOTE — TELEPHONE ENCOUNTER
Phoned patient to check in on tandem start. His pump is not in Control IQ  Sent instructions how; LVM with info.

## 2025-02-06 NOTE — PATIENT INSTRUCTIONS
Return Visit:  With MITALI Castaneda: Return in about 3 months (around 5/6/2025) for diabetes follow up.      [facility] primary care office: Call [phone], say you're looking for a primary care, ask who is accepting new patients     Tandem Tslim + Control IQ + Dexcom G7  Time Basal ICR ISF Active Insulin Time Target   MN 1.50 u/hr   7 --> 6 26 --> 22 5h 110       - complete your diabetes eye exam. This exam is critical to preventing and treating eye conditions caused by diabetes that could potentially lead to vision loss. Once complete, please call your eye care provider and ask them to fax your latest report to our office. This will help us update our records. Our fax number is: [phone]      Updated diabetes medication instructions from today:   Diabetic Medications               Glucagon (BAQSIMI TWO PACK NA) (Taking As Needed) 1 each by Nasal route daily as needed (low BG).    HUMALOG 100 UNIT/ML Injection Solution (Taking) Use to fill insulin pump with up to 130u/day    insulin degludec (TRESIBA FLEXTOUCH) 100 UNIT/ML Subcutaneous Solution Pen-injector (Taking) Use in the event of pump failure. Inject 30u daily.            Lab Results   Component Value Date    A1C 6.8 (A) 02/06/2025    A1C 8.4 (H) 12/05/2024    A1C 8.2 (A) 12/05/2024    A1C 8.2 (A) 07/24/2024        General follow up information:  Please let us know if you require any refills at least 1 week prior to your medication running out. If you do run out of medication, please call our office ASAP to request refills (do not wait until your follow up).   Please call our office if sugars at home are consistently greater than 250 or less than 70 for medication adjustment (do not wait until your follow up appointment).  Lab results and imaging will typically be reviewed at follow up appointments, or within 3-5 business days of ALL results being in if you do not have an appointment scheduled in the near future. Our office will contact you  for any abnormal results requiring more urgent follow up or action.   The on-call pager is for urgent matters only. If you are a type 1 diabetic and run out of insulin after business hours 8AM-4PM, you may call the on-call pager for a refill to a 24 hour pharmacy.  If you have adrenal insufficiency and run out of steroids, you may call the on-call pager for a refill to a 24 hour pharmacy. All other refill requests should be requested during business hours.    Office phone number: [phone]; phones are open Monday-Friday 8:30-4:30.       HOW TO TREAT LOW BLOOD SUGAR (Hypoglycemia)  Low blood sugar= Less than 70, or if you start to have symptoms (below)  Symptoms: Shaking or trembling, fast heart rate, extreme hunger, sweating, confusion/difficulty concentrating, dizziness.    How to treat a low blood sugar if you are able to eat/drink: The Rule of 15/15  If you are using continuous glucose monitor that says you are low, but you do not have any symptoms, verify on fingerstick that your blood sugar is actually low before treating.   Eat 15 grams of carbs (see examples below)  Check your blood sugar after 15 minutes. If it’s still below your target range, have another serving.   Repeat these steps until it’s in your target range. Once it’s in range, if you're nervous about your sugar going low again, have a protein source (ie, a spoonful of peanut butter).   If you have a CGM you want to look for how your arrow has changed. If you arrow is pointed up or sideways after 15 min, give your CGM more time OR check with a finger stick. Try not to eat more food until at least 15 min after the first BG check - otherwise you risk having a rebound high.  If you are experiencing symptoms and you are unable to check your blood glucose for any reason, treat the hypoglycemia.  If someone has a low blood sugar and is unconscious: Don’t hesitate to call 911. If someone is unconscious and glucagon is not available or someone does not  know how to use it, call 911 immediately.     To treat a low, I recommend you carry with you easy, pre-portioned treatment for low blood sugars that are 15G of carbs:   - Children sized squeeze pouch applesauce (high fiber + carbs help prevent too high of a spike)  - Small children's sized juicebox- 15g carb --> 4oz juice box  - Glucose tablets from [employer], you can find them near diabetes supplies --> Note, you will need to eat 3-4 tablets to get to 15g of carbs  - Children sized fruit snack pack- look for one with 15 grams of total carbohydrate  - Choice of how to treat your low is important. Complex carbs, or foods that contain fats along with carbs (like chocolate) can slow the absorption of glucose and should NOT be used to treat an emergency low

## 2025-02-06 NOTE — PROGRESS NOTES
[facility]    Name: Reuben Thompson    Date: 2/6/2025    Reuben Thompson is a 24 year old male who presents for evaluation of T1DM management.     Chief complaint: Follow - Up (Pt presents for DM follow up. Repeat A1C completed in clinic.  )     Subjective:   Initial HPI consult in July 2024  Here to establish care for uncontrolled type 1 diabetes.  Is transferring care from [facility], in [place].  Previously followed with Dr. Kailey Olivas.  Reviewed chart notes has faxed over.  M hx:  -Diagnosed with diabetes at age 18 months.   - Started insulin pump age 7  - Did have 1 severe hypoglycemia episode as a child  - Denies history of DKA, was hospitalized at a young age for high BG   - History of celiac disease diagnosed at a young age-- he eats gluten now and hasn't had a problem  - Family history- none known    - Notes the hardest part for him is to remember to enter [employer] [facility] [employer]  - No known fam hx of autoimmunity   -Does NOT have any associated DM complications, including: Macrovascular complications (CAD/CVA/PAD), neuropathy, retinopathy, nephropathy, HTN, HLD, stroke, gastroparesis. No hx of pancreatitis.   -Lifestyle: lives stepmom and dad, brother and sister. Works as a  for [employer]-- day shift. He enjoys cars. Also drives motorcycles. He does consume alcohol on the weekends. He states he 'eats like he doesn't have diabetes'. Now that he's moved home with stepmom and dad there is more food and financial stability    Interim hx:  2/6/2025-libertad/ Caitlyn JASMINE. In person visit. Last A1c value was 6.8% done 2/6/2025.  Since last office visit, upgraded to his Medtronic pump to a tandem pump.  He is really liking the automated system.  He feels blood sugars are better controlled, he is sleeping better, better energy during the day.  Urinating less frequently.  Has only had a couple of lows.  Has had some failed sites, but overall doing really well.  He notes frustration with the  length of tubing he ordered, it is quite long, so for his next order he is going to order shorter tubing.  Changing the cartridge every 3 days.    Tandem Tslim + Control IQ + Dexcom G7  Time Basal ICR ISF Active Insulin Time Target   MN 1.50 u/hr 7 26 5h 110   TDD 82u/day      DM meds at visit:   Diabetic Medications               HUMALOG 100 UNIT/ML Injection Solution (Taking) Use to fill insulin pump with up to 130u/day    Glucagon (BAQSIMI TWO PACK NA) (Taking As Needed) 1 each by Nasal route daily as needed (low BG).    insulin degludec (TRESIBA FLEXTOUCH) 100 UNIT/ML Subcutaneous Solution Pen-injector (Taking) Use in the event of pump failure. Inject 30u daily.            Continuous Glucose Monitoring Interpretation  Reuben Thompson has undergone continuous glucose monitoring with their CGM.  The blood glucose tracings were evaluated for two weeks prior to office visit.   Blood glucose tracings demonstrated areas of hyperglycemia post-meal, particularly post-lunch despite carb entry  There were no significant hypoglycemia noted.              History/Other:    Allergies, PMH, SocHx and FHx reviewed and updated as appropriate in Epic on    Continuous Glucose Sensor (DEXCOM G7 SENSOR) Does not apply Misc 1 each Every 10 days. Use as directed every 10 days 9 each 3    HUMALOG 100 UNIT/ML Injection Solution Use to fill insulin pump with up to 130u/day 130 mL 2    Glucagon (BAQSIMI TWO PACK NA) 1 each by Nasal route daily as needed (low BG).      Urine Glucose-Ketones Test In Vitro Strip Use as needed, with ANY episode of VOMITING, during illness, and/or if 2+ BG >250 despite usual correction. 100 strip 0    Insulin Pen Needle (BD PEN NEEDLE OVIDIO U/F) 32G X 4 MM Does not apply Misc Use in the event of pump failure to inject lantus daily. 100 each 1    insulin degludec (TRESIBA FLEXTOUCH) 100 UNIT/ML Subcutaneous Solution Pen-injector Use in the event of pump failure. Inject 30u daily. 15 mL 1    Insulin  Syringe-Needle U-100 31G X 15/64\" 0.5 ML Does not apply Misc 1 Syringe 3 (three) times daily before meals as needed. Use in the event of pump failure to inject humalog from vials 100 each 0    Insulin Infusion Pump (T:SLIM X2 CONTROL-IQ PUMP) Does not apply Device Ordered through tandem 7/24/2024       No Known Allergies  Current Outpatient Medications   Medication Sig Dispense Refill    Continuous Glucose Sensor (DEXCOM G7 SENSOR) Does not apply Misc 1 each Every 10 days. Use as directed every 10 days 9 each 3    HUMALOG 100 UNIT/ML Injection Solution Use to fill insulin pump with up to 130u/day 130 mL 2    Glucagon (BAQSIMI TWO PACK NA) 1 each by Nasal route daily as needed (low BG).      Urine Glucose-Ketones Test In Vitro Strip Use as needed, with ANY episode of VOMITING, during illness, and/or if 2+ BG >250 despite usual correction. 100 strip 0    Insulin Pen Needle (BD PEN NEEDLE OVIDIO U/F) 32G X 4 MM Does not apply Misc Use in the event of pump failure to inject lantus daily. 100 each 1    insulin degludec (TRESIBA FLEXTOUCH) 100 UNIT/ML Subcutaneous Solution Pen-injector Use in the event of pump failure. Inject 30u daily. 15 mL 1    Insulin Syringe-Needle U-100 31G X 15/64\" 0.5 ML Does not apply Misc 1 Syringe 3 (three) times daily before meals as needed. Use in the event of pump failure to inject humalog from vials 100 each 0    Insulin Infusion Pump (T:SLIM X2 CONTROL-IQ PUMP) Does not apply Device Ordered through tandem 7/24/2024       Past Medical History:    Type 1 diabetes (HCC)     History reviewed. No pertinent family history.  Social history: Reviewed.      ROS/Exam    REVIEW OF SYSTEMS: Ten point review of systems has been performed and is otherwise negative and/or non-contributory, except as described above.     VITALS  Vitals:    02/06/25 0922   BP: 122/70   BP Location: Right arm   Patient Position: Sitting   Cuff Size: adult   Pulse: (!) 49   SpO2: 94%   Weight: 192 lb 9.6 oz (87.4 kg)          Wt Readings from Last 6 Encounters:   02/06/25 192 lb 9.6 oz (87.4 kg)   12/05/24 179 lb 3.2 oz (81.3 kg)   09/26/24 170 lb 9.6 oz (77.4 kg)   07/24/24 165 lb (74.8 kg)       PHYSICAL EXAM  CONSTITUTIONAL:  awake, alert, cooperative, no apparent distress, and appears stated age   PSYCH: normal affect  LUNGS: breathing comfortably  CARDIOVASCULAR:  regular rate     Labs/Imaging: Pertinent imaging reviewed.    Overall glucose control:  Lab Results   Component Value Date    A1C 6.8 (A) 02/06/2025    A1C 8.4 (H) 12/05/2024    A1C 8.2 (A) 12/05/2024    A1C 8.2 (A) 07/24/2024       Supplementary Documentation:   -Surveillance for Diabetes Complications & Risks  Foot exam/neuropathy: No data recorded  Retinopathy screening: No data recordedNo data recorded- due, reminded    Assessment & Plan:     ICD-10-CM    1. Type 1 diabetes mellitus with hyperglycemia (HCC)  E10.65 POC Hemoglobin A1C     GLUC MNTR CONT REC FROM NTRSTL TISS FLU PHYS I&R     Continuous Glucose Sensor (DEXCOM G7 SENSOR) Does not apply Misc     HUMALOG 100 UNIT/ML Injection Solution      2. Insulin pump titration  Z46.81             Reuben Thompson is a pleasant 24 year old male here for evaluation of:    #Type 1 Diabetes- PMHx of Type 1 diabetes mellitus diagnosed in 2001. Switched to insulin pump in 2009 (age 7). Historically uncontrolled DM.  Improving (states today is his best A1C in a long time). Has more stability in current living situation.   Switched from Medtronic manual mode pump to Tandem Tslim + dexcom in Dec 2024.     Much improvement in glycemic control with the addition of tandem insulin pump.  Some postmeal hyperglycemia, will adjust carb ratio, as well as ISF.    Last A1c value was 6.8% done 2/6/2025. Goal <7%. Importance of better glucose control in preventing onset/progression of end-organ damage discussed, as well as goals of therapy and clinical significance of A1C.     - med changes:  Tandem Tslim + Control IQ + Dexcom  G7  Time Basal ICR ISF Active Insulin Time Target   MN 1.50 u/hr   7 --> 6 26 --> 22 5h 110       - pump backup plan: tresiba 30u daily, humalog ICR 1:8, ISF 1:40>140   - continue Dexcom G7 CGM with phone (connected to clinic profile)  - patient is on insulin, and has suboptimal glycemic control including wide glycemic swings, thus would benefit from CGM  - pump backup plan: tresiba 30u daily, humalog ICR 1:8, ISF 1:40>140   -See above header \"Supplementary Documentation\" for surveillance for diabetes complications & risks  - Given dexcom sample LOT#[id number]    Nephropathy screening  -stable, continue annual monitoring  Lab Results   Component Value Date    EGFRCR 115 12/05/2024    MICROALBCREA  12/12/2024      Comment:      Unable to calculate due to Urine Microalbumin <0.3 mg/dL            Blood pressure control: SBP is not to goal <130   - stable  BP Readings from Last 1 Encounters:   02/06/25 122/70   BP Meds:  none    Lipids:  stable, continue annual monitoring  Lab Results   Component Value Date    LDL 90 12/05/2024    TRIG 65 12/05/2024   Cholesterol Meds:  none      Return in about 3 months (around 5/6/2025) for diabetes follow up.    A total of 35 minutes was spent today on obtaining history, reviewing pertinent labs, evaluating patient, providing multiple treatment options, reinforcing diet/exercise and compliance, and completing documentation.     2/6/2025  MITALI Csataneda      Note to patient: The 21 Century Cures Act makes medical notes like these available to patients in the interest of transparency. However, be advised this is a medical document. It is intended as peer to peer communication. It is written in medical language and may contain abbreviations or verbiage that are unfamiliar. It may appear blunt or direct. Medical documents are intended to carry relevant information, facts as evident, and the clinical opinion of the practitioner.

## 2025-07-16 NOTE — PROGRESS NOTES
The following individual(s) verbally consented to be recorded using ambient AI listening technology and understand that they can each withdraw their consent to this listening technology at any point by asking the clinician to turn off or pause the recording:    Patient name: Reuben Thompson

## 2025-07-16 NOTE — PROGRESS NOTES
[facility]    Name: Reuben Thompson    Date: 7/16/2025     Chief complaint: Follow - Up (Pt presents for DM follow up. Repeat A1C completed in clinic.  No new concerns. )       Subjective:   History of Present Illness  Reuben Thompson is a 24 year old male with type 1 diabetes who presents for a follow-up visit.    Glycemic control  - Type 1 diabetes mellitus with recent stabilization of blood glucose levels following adjustments to carbohydrate ratio and sensitivity factor  - Time in range is 47% on pump report is over the last two weeks  - Hemoglobin A1c is 7%, stable from previous value of 6.8%  - Blood glucose levels rise in the evening, particularly with inaccurate carbohydrate intake entries  - Blood glucose levels are not significantly affected by physical activity or work  - Zero sugar energy drinks are used to manage morning blood glucose levels  - minimal hypoglycemia    Insulin pump and glucose monitoring adherence  - Brief lapse in insulin pump usage due to forgetting to charge the device, resulting in elevated nocturnal blood glucose levels  - Recent issues with Dexcom sensors falling off or malfunctioning; awaiting new sensors    Lifestyle and social factors affecting diabetes management  - 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 Inject 40u daily.    HUMALOG 100 UNIT/ML Injection Solution (Taking) Use to fill insulin pump with up to 130u/day    Glucagon (BAQSIMI TWO PACK NA) (Taking As Needed) 1 each by Nasal route daily as needed (low BG).            Continuous Glucose Monitoring Interpretation  Reuben Thompson has undergone continuous glucose monitoring.  The blood  glucose tracings were evaluated for two weeks prior to office visit. Blood glucose tracings demonstrated areas of hyperglycemia post-meal, particularly post-dinner. There were occasional hypoglycemia post-Control IQ bolus on pump. during the weeks of evaluation.          History/Other:    Allergies, PMH, SocHx and FHx reviewed and updated as appropriate in Epic on Current Medications[1]  Allergies[2]  Current Medications[3]  Past Medical History[4]  Family History[5]  Social history: Reviewed.      ROS/Exam    REVIEW OF SYSTEMS: Ten point review of systems has been performed and is otherwise negative and/or non-contributory, except as described above.     VITALS  Vitals:    07/16/25 0843   BP: 116/70   BP Location: Left arm   Patient Position: Sitting   Cuff Size: adult   Pulse: 94   SpO2: 97%   Weight: 188 lb 3.2 oz (85.4 kg)   Height: 5' 10\" (1.778 m)       Body mass index is 27 kg/m².  Wt Readings from Last 6 Encounters:   07/16/25 188 lb 3.2 oz (85.4 kg)   02/06/25 192 lb 9.6 oz (87.4 kg)   12/05/24 179 lb 3.2 oz (81.3 kg)   09/26/24 170 lb 9.6 oz (77.4 kg)   07/24/24 165 lb (74.8 kg)       PHYSICAL EXAM  CONSTITUTIONAL:  awake, alert, cooperative, no apparent distress, and appears stated age    PSYCH: normal affect  LUNGS: breathing comfortably  CARDIOVASCULAR:  regular rate    Diabetes foot exam performed: Bilateral foot exam w/ normal skin temperature and turgor. Monofilament/sensation of bilateral feet is normal. Vibration to dorsum to the first toe perceived bilaterally. Bilateral dorsalis pedis +1. Capillary refill <3 seconds. Foot care practices reviewed with patient.      Labs/Imaging: Pertinent imaging reviewed.    Thyroid labs (if present in chart):  Recent Labs     12/05/24  1013   T4F 1.2   TSH 1.105        Thyroid ultrasound results (if present in chart):  No results found.    Overall glucose control:  Lab Results   Component Value Date    A1C 6.8 (A) 02/06/2025    A1C 8.4 (H) 12/05/2024    A1C 8.2  (A) 12/05/2024    A1C 8.2 (A) 07/24/2024       Supplementary Documentation:   -Surveillance for Diabetes Complications & Risks  Foot exam/neuropathy: Last Foot Exam: 07/16/25       Retinopathy screening: No data recordedNo data recorded - due, reminded     Lipid screening:   Lab Results   Component Value Date    LDL 90 12/05/2024    TRIG 65 12/05/2024   Cholesterol Meds:  none     Nephropathy screening:   Lab Results   Component Value Date    EGFRCR 115 12/05/2024    MICROALBCREA  12/12/2024      Comment:      Unable to calculate due to Urine Microalbumin <0.3 mg/dL       No results found for: \"CREAUR\", \"MICROALBUMIN\", \"MALBCREACALC\"    Blood pressure control:   BP Readings from Last 1 Encounters:   07/16/25 116/70   BP Meds:        Assessment & Plan:   Overview:   1. Type 1 diabetes mellitus with hyperglycemia (HCC) (Primary)  Overview:  PMHx of Type 1 diabetes mellitus diagnosed in 2001- age 1. Switched to insulin pump in 2009 (age 7). Switched from Medtronic manual mode pump to Tandem Tslim + dexcom in Dec 2024.     - pump backup plan: tresiba 40u daily, humalog ICR 1:8, ISF 1:40>140 TID  - continue Dexcom G7 CGM with phone (connected to clinic profile)  Orders:  -     POC Hemoglobin A1C  -     GLUC MNTR CONT REC FROM NTRSTL TISS FLU PHYS I&R  -     Tresiba FlexTouch; Use in the event of pump failure. Inject 40u daily.  Dispense: 15 mL; Refill: 1  -     BD Pen Needle [place] U/F; Use in the event of pump failure to inject lantus daily.  Dispense: 100 each; Refill: 1  2. Insulin pump titration      Assessment & Plan  Type 1 Diabetes Mellitus  Type 1 Diabetes Mellitus with recent adjustments to carb ratio and sensitivity factor at last office visit. Blood glucose levels are more stable with current regimen, though there are occasional spikes, particularly in the evening. A1c is at 7%. There is a trend of elevated glucose levels in the evening, possibly due to inaccurate carb counting or pump settings. He has  experienced minimal hypoglycemic episodes, with lows not dropping below 73 mg/dL. There is a need to improve time in range, currently at 47%, with a goal of 70%.  - Increase max bolus on pump to 15-units for better correction of high carb meals. Currently was getting maxed out at 10u and sometimes is eating larger carb   - Encourage routine charging of the insulin pump during shower time to prevent lapses in pump function.  - Consider using the enosiX unruly for more accurate carb counting, especially for high-carb meals.  - Discussed potential use of sleep mode on Tandem pump for overnight glucose control, defer until after job schedule changes- will be going to night shift.  - pump backup plan: tresiba 30u daily, humalog ICR 1:8, ISF 1:40>140   - Schedule eye exam for diabetic retinopathy screening.  - Performed foot exam today to check for neuropathy or other issues.  - see above overview for further diabetes hx, see above header \"Supplementary Documentation\" for surveillance for diabetes complications & risks      Updated DM med list:   Diabetic Medications              insulin degludec (TRESIBA FLEXTOUCH) 100 UNIT/ML Subcutaneous Solution Pen-injector (Taking) Use in the event of pump failure. Inject 40u daily.    HUMALOG 100 UNIT/ML Injection Solution (Taking) Use to fill insulin pump with up to 130u/day    Glucagon (BAQSIMI TWO PACK NA) (Taking As Needed) 1 each by Nasal route daily as needed (low BG).          Return in about 4 months (around 11/16/2025) for diabetes follow up.    A total of 40 minutes was spent today on reviewing pump report, obtaining history, reviewing pertinent labs, evaluating patient, providing multiple treatment options, reinforcing diet/exercise and compliance, and completing documentation.     MITALI Castaneda  [facility], Diabetes [facility] Metabolism   7/16/2025    Note to patient: The 21 Century Cures Act makes medical notes like these available to patients in the  interest of transparency. However, be advised this is a medical document. It is intended as peer to peer communication. It is written in medical language and may contain abbreviations or verbiage that are unfamiliar. It may appear blunt or direct. Medical documents are intended to carry relevant information, facts as evident, and the clinical opinion of the practitioner.          [1]    insulin degludec (TRESIBA FLEXTOUCH) 100 UNIT/ML Subcutaneous Solution Pen-injector Use in the event of pump failure. Inject 40u daily. 15 mL 1    Insulin Pen Needle (BD PEN NEEDLE OVIDIO U/F) 32G X 4 MM Does not apply Misc Use in the event of pump failure to inject lantus daily. 100 each 1    Continuous Glucose Sensor (DEXCOM G7 SENSOR) Does not apply Misc 1 each Every 10 days. Use as directed every 10 days 9 each 3    HUMALOG 100 UNIT/ML Injection Solution Use to fill insulin pump with up to 130u/day 130 mL 2    Glucagon (BAQSIMI TWO PACK NA) 1 each by Nasal route daily as needed (low BG).      Urine Glucose-Ketones Test In Vitro Strip Use as needed, with ANY episode of VOMITING, during illness, and/or if 2+ BG >250 despite usual correction. 100 strip 0    Insulin Syringe-Needle U-100 31G X 15/64\" 0.5 ML Does not apply Misc 1 Syringe 3 (three) times daily before meals as needed. Use in the event of pump failure to inject humalog from vials 100 each 0    Insulin Infusion Pump (T:SLIM X2 CONTROL-IQ PUMP) Does not apply Device Ordered through tandem 7/24/2024     [2] No Known Allergies  [3]   Current Outpatient Medications   Medication Sig Dispense Refill    insulin degludec (TRESIBA FLEXTOUCH) 100 UNIT/ML Subcutaneous Solution Pen-injector Use in the event of pump failure. Inject 40u daily. 15 mL 1    Insulin Pen Needle (BD PEN NEEDLE OVIDIO U/F) 32G X 4 MM Does not apply Misc Use in the event of pump failure to inject lantus daily. 100 each 1    Continuous Glucose Sensor (DEXCOM G7 SENSOR) Does not apply Misc 1 each Every 10 days. Use  as directed every 10 days 9 each 3    HUMALOG 100 UNIT/ML Injection Solution Use to fill insulin pump with up to 130u/day 130 mL 2    Glucagon (BAQSIMI TWO PACK NA) 1 each by Nasal route daily as needed (low BG).      Urine Glucose-Ketones Test In Vitro Strip Use as needed, with ANY episode of VOMITING, during illness, and/or if 2+ BG >250 despite usual correction. 100 strip 0    Insulin Syringe-Needle U-100 31G X 15/64\" 0.5 ML Does not apply Misc 1 Syringe 3 (three) times daily before meals as needed. Use in the event of pump failure to inject humalog from vials 100 each 0    Insulin Infusion Pump (T:SLIM X2 CONTROL-IQ PUMP) Does not apply Device Ordered through tandem 7/24/2024     [4]   Past Medical History:   Type 1 diabetes (HCC)   [5] No family history on file.

## 2025-07-16 NOTE — PATIENT INSTRUCTIONS
Return Visit:  With MITALI Castaneda: No follow-ups on file.       VISIT SUMMARY:  Today, we reviewed your type 1 diabetes management. Your blood glucose levels have stabilized with recent adjustments, but there are still occasional spikes, especially in the evening. Your A1c is stable at 7%. We discussed strategies to improve your time in range and addressed issues with your insulin pump and glucose monitoring.    YOUR PLAN:  TYPE 1 DIABETES MELLITUS: Your blood glucose levels are more stable with the current regimen, though there are occasional spikes, particularly in the evening. Your A1c is at 7%, and there is a need to improve your time in range, currently at 47%, with a goal of 70%.  -Increase the maximum bolus on your pump to 15-20 units for better correction of high-carb meals.  -Charge your insulin pump during shower time to prevent lapses in pump function.  -Consider using the [employer] unruly for more accurate carb counting, especially for high-carb meals.  -consider use of sleep mode on your Tandem pump for overnight glucose control after your job schedule changes.  -Ensure your backup insulin pen (Tresiba) is available for emergency use if your pump fails. Take 40u nightly  -Schedule an eye exam for diabetic retinopathy screening.  -We performed a foot exam today to check for neuropathy or other issues.  -Follow up in four months for routine diabetes management and annual labs.    Contains text generated by Tory     - complete your diabetes eye exam. This exam is critical to preventing and treating eye conditions caused by diabetes that could potentially lead to vision loss. Once complete, please call your eye care provider and ask them to fax your latest report to our office. This will help us update our records. Our fax number is: [phone]    Ophthalmologists:  [place]:  Dr. Oppenheim in [place]- [phone]  [facility]- Dr. Wilkerson- [phone]  [facility]:   [phone]    [place]:  Dr. Singh- [phone]   [facility] [address] [place], [place] [address]- [phone]  [facility] [place] - Dr. Bill [phone]      Updated diabetes medication instructions from today:   Diabetic Medications              HUMALOG 100 UNIT/ML Injection Solution (Taking) Use to fill insulin pump with up to 130u/day    Glucagon (BAQSIMI TWO PACK NA) (Taking As Needed) 1 each by Nasal route daily as needed (low BG).    insulin degludec (TRESIBA FLEXTOUCH) 100 UNIT/ML Subcutaneous Solution Pen-injector (Taking) Use in the event of pump failure. Inject 30u daily.            Lab Results   Component Value Date    A1C 6.8 (A) 02/06/2025    A1C 8.4 (H) 12/05/2024    A1C 8.2 (A) 12/05/2024    A1C 8.2 (A) 07/24/2024        General follow up information:  Please let us know if you require any refills at least 1 week prior to your medication running out. If you do run out of medication, please call our office ASAP to request refills (do not wait until your follow up).   Please call our office if sugars at home are consistently greater than 250 or less than 70 for medication adjustment (do not wait until your follow up appointment).  Lab results and imaging will typically be reviewed at follow up appointments, or within 3-5 business days of ALL results being in if you do not have an appointment scheduled in the near future. Our office will contact you for any abnormal results requiring more urgent follow up or action.   The on-call pager is for urgent matters only. If you are a type 1 diabetic and run out of insulin after business hours 8AM-4PM, you may call the on-call pager for a refill to a 24 hour pharmacy.  If you have adrenal insufficiency and run out of steroids, you may call the on-call pager for a refill to a 24 hour pharmacy. All other refill requests should be requested during business hours.    Office phone number: [phone]; phones are open  Monday-Friday 8:30-4:30.       HOW TO TREAT LOW BLOOD SUGAR (Hypoglycemia)  Low blood sugar= Less than 70, or if you start to have symptoms (below)  Symptoms: Shaking or trembling, fast heart rate, extreme hunger, sweating, confusion/difficulty concentrating, dizziness.    How to treat a low blood sugar if you are able to eat/drink: The Rule of 15/15  If you are using continuous glucose monitor that says you are low, but you do not have any symptoms, verify on fingerstick that your blood sugar is actually low before treating.   Eat 15 grams of carbs (see examples below)  Check your blood sugar after 15 minutes. If it’s still below your target range, have another serving.   Repeat these steps until it’s in your target range. Once it’s in range, if you're nervous about your sugar going low again, have a protein source (ie, a spoonful of peanut butter).   If you have a CGM you want to look for how your arrow has changed. If you arrow is pointed up or sideways after 15 min, give your CGM more time OR check with a finger stick. Try not to eat more food until at least 15 min after the first BG check - otherwise you risk having a rebound high.  If you are experiencing symptoms and you are unable to check your blood glucose for any reason, treat the hypoglycemia.  If someone has a low blood sugar and is unconscious: Don’t hesitate to call 911. If someone is unconscious and glucagon is not available or someone does not know how to use it, call 911 immediately.     To treat a low, I recommend you carry with you easy, pre-portioned treatment for low blood sugars that are 15G of carbs:   - Children sized squeeze pouch applesauce (high fiber + carbs help prevent too high of a spike)  - Small children's sized juicebox- 15g carb --> 4oz juice box  - Glucose tablets from [email or web address], you can find them near diabetes supplies --> Note, you will need to eat 3-4 tablets to get to 15g of carbs  - Children sized fruit snack pack- look  for one with 15 grams of total carbohydrate  - Choice of how to treat your low is important. Complex carbs, or foods that contain fats along with carbs (like chocolate) can slow the absorption of glucose and should NOT be used to treat an emergency low

## (undated) NOTE — LETTER
Date: 7/16/2025    Patient Name: Reuben Thompson        To Whom it may concern:    This letter has been written at the patient's request. The above patient was seen at [facility] for treatment of a medical condition.    This patient should be excused from attending work/school on 7/16/25 for an appointment.    The patient may return to work/school on 7/17/25 with no restriction.        Sincerely,      MITALI Castaneda

## (undated) NOTE — Clinical Note
He only has Thursdays off and I am OOO starting next Thursday - do you want me to send a reminder to your or nursing team to do a quick check in?  His next follow up is in Feb